# Patient Record
Sex: FEMALE | Race: WHITE | NOT HISPANIC OR LATINO | URBAN - METROPOLITAN AREA
[De-identification: names, ages, dates, MRNs, and addresses within clinical notes are randomized per-mention and may not be internally consistent; named-entity substitution may affect disease eponyms.]

---

## 2017-06-17 ENCOUNTER — EMERGENCY (EMERGENCY)
Facility: HOSPITAL | Age: 82
LOS: 1 days | Discharge: HOME | End: 2017-06-17
Admitting: EMERGENCY MEDICINE
Payer: MEDICARE

## 2017-06-17 VITALS
RESPIRATION RATE: 17 BRPM | OXYGEN SATURATION: 95 % | DIASTOLIC BLOOD PRESSURE: 80 MMHG | SYSTOLIC BLOOD PRESSURE: 193 MMHG | HEART RATE: 94 BPM

## 2017-06-17 DIAGNOSIS — S01.20XA UNSPECIFIED OPEN WOUND OF NOSE, INITIAL ENCOUNTER: ICD-10-CM

## 2017-06-17 PROCEDURE — 99284 EMERGENCY DEPT VISIT MOD MDM: CPT

## 2017-06-17 NOTE — ED PROVIDER NOTE - ENMT, MLM
Airway patent, Nasal mucosa clear. Mouth with normal mucosa. Throat has no vesicles, no oropharyngeal exudates and uvula is midline. Airway patent, Nasal mucosa clear. deep skin avulsion to tip of nose, hemostasis achieved with direct pressure.

## 2017-06-17 NOTE — ED PROVIDER NOTE - MEDICAL DECISION MAKING DETAILS
skin avulsion nose seen by dr wolf in ED, dressed with bacitracin, tetanus utd, f/u dr wolf outpatient.

## 2017-06-17 NOTE — ED PROVIDER NOTE - PROGRESS NOTE DETAILS
offered plastics for possible reattachment of flap of skin, patient agreed, placed flap of skin in ice, Dr. Gomez en route for repair.

## 2017-06-17 NOTE — ED PROVIDER NOTE - OBJECTIVE STATEMENT
89 yo female here c/o nose injury after she accidently cut the tip of her nose off while she was cutting a piece of floss from her mouth about 1.5 hours ago. She has brought the piece of nose in a pill bottle. Tetanus utd. Not on blood thinners.

## 2017-06-18 VITALS
DIASTOLIC BLOOD PRESSURE: 87 MMHG | TEMPERATURE: 98 F | RESPIRATION RATE: 17 BRPM | OXYGEN SATURATION: 96 % | SYSTOLIC BLOOD PRESSURE: 153 MMHG | HEART RATE: 96 BPM

## 2017-06-18 NOTE — ED ADULT NURSE NOTE - OBJECTIVE STATEMENT
89y female, came in acccompanied by relatives for 'cut to tip of nose'. tetanus UTD. no active bleeding.